# Patient Record
Sex: FEMALE | Race: WHITE | NOT HISPANIC OR LATINO | Employment: FULL TIME | URBAN - METROPOLITAN AREA
[De-identification: names, ages, dates, MRNs, and addresses within clinical notes are randomized per-mention and may not be internally consistent; named-entity substitution may affect disease eponyms.]

---

## 2024-01-22 ENCOUNTER — TELEPHONE (OUTPATIENT)
Dept: CARDIOLOGY | Facility: CLINIC | Age: 44
End: 2024-01-22
Payer: COMMERCIAL

## 2024-01-22 DIAGNOSIS — R00.2 PALPITATIONS: ICD-10-CM

## 2024-01-22 DIAGNOSIS — R55 SYNCOPE AND COLLAPSE: Primary | ICD-10-CM

## 2024-01-22 NOTE — TELEPHONE ENCOUNTER
"Caller: Leora TONY \"Leora Tony\"     Relationship: [unfilled]     Best call back number: [phone]    What is your medical concern? [place] [employer] [place] AND FELT VERY LIGHTHEADED AND LIKE SHE WAS GOING TO PASS OUT. SHE WENT TO THE EMERGENCY ROOM AT [facility] IN [place] AND HER HEART RATE WAS VERY ELEVATED.PATIENT REPORTS THAT HER BODY GETS HOT AND HER LEGS FEEL VERY WEAK, LIKE SHE'S GOING TO PASS OUT     How long has this issue been going on? FIRST TIME THIS HAS HAPPENED IN A WHILE    Is your provider already aware of this issue? IN THE PAST     Have you been treated for this issue? YES    HUB SCHEDULED FIRST AVAILABLE FOLLOW UP WITH DR. ISAAC BUT PATIENT THINKS SHE MAY NEED TO BE SEEN SOONER OR WEAR A HEART MONITOR.   "